# Patient Record
Sex: MALE | Race: WHITE | NOT HISPANIC OR LATINO | Employment: STUDENT | URBAN - METROPOLITAN AREA
[De-identification: names, ages, dates, MRNs, and addresses within clinical notes are randomized per-mention and may not be internally consistent; named-entity substitution may affect disease eponyms.]

---
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
## 2021-01-22 DIAGNOSIS — F41.1 GENERALIZED ANXIETY DISORDER: ICD-10-CM

## 2021-01-25 NOTE — TELEPHONE ENCOUNTER
Last seen: 8/20/20  RTC: 3 months- pt may be transferring care, as he's moving to [place]  Cancel: none  No-show: none  Next appt: none    Called pt and confirmed he no longer needs the [facility] psychiatry clinic to prescribe his Prozac. Pt said he has established care in [place]. Refused refill request with note stating this.

## 2021-02-09 DIAGNOSIS — F41.1 GENERALIZED ANXIETY DISORDER: ICD-10-CM

## 2021-02-09 RX ORDER — FLUOXETINE 40 MG/1
40 CAPSULE ORAL DAILY
Qty: 30 CAPSULE | Refills: 0 | OUTPATIENT
Start: 2021-02-09

## 2021-02-10 NOTE — TELEPHONE ENCOUNTER
"Per notes PSY staff 1/22/2021 \"Called pt and confirmed he no longer needs the [facility] psychiatry clinic to prescribe his Prozac. Pt said he has established care in [place]. Refused refill request with note stating this. \"  Denied refill request from [facility].       "

## 2023-05-09 NOTE — ADDENDUM NOTE
Encounter addended by: Jackie Gagnon on: 12/30/2019 2:16 PM   Actions taken: Episode edited
Quality 431: Preventive Care And Screening: Unhealthy Alcohol Use - Screening: Patient not identified as an unhealthy alcohol user when screened for unhealthy alcohol use using a systematic screening method
Detail Level: Detailed
Quality 110: Preventive Care And Screening: Influenza Immunization: Influenza Immunization not Administered because Patient Refused.
Quality 226: Preventive Care And Screening: Tobacco Use: Screening And Cessation Intervention: Patient screened for tobacco use and is an ex/non-smoker
Quality 130: Documentation Of Current Medications In The Medical Record: Current Medications Documented

## 2023-09-28 NOTE — PROGRESS NOTES
ASSESSMENT AND PLAN:   (F41.1) ASHLEY (generalized anxiety disorder)  (primary encounter diagnosis)  Comment: Patient reports continuing anxiety from school work. Endorses that Buspirone has done a good job controlling the symptoms.  Plan: busPIRone (BUSPAR) 10 MG tablet, BID    (L70.0) Acne vulgaris  Comment: Patient has been using the cream to control acne vulgaris, and it has done a good job controlling the symptoms.  Plan: tretinoin (RETIN-A) 0.05 % external cream    (F40.248) Fear of public speaking  Comment: Patient reports high anxiety during public speaking during school work, and the medication does a good job controlling the symptoms.  Plan: propranolol (INDERAL) 10 MG tablet    (F90.0) Attention deficit hyperactivity disorder (ADHD), predominantly inattentive type  Comment: has not needed it for a couple of years; however, needs to concentrate to do a better job with current school work.  Plan: amphetamine-dextroamphetamine (ADDERALL) 10 MG         tablet    (Z23) Need for prophylactic vaccination and inoculation against influenza  Comment: Patient agrees to be vaccinated for the flu season.  Plan: INFLUENZA VACCINE >6 MONTHS (AFLURIA/FLUZONE)      ANUP Youngblood  09/29/23      I was present with the medical student who participated in the service and in the documentation of the note. I have verified the history and personally performed the physical exam and medical decision making. I agree with the assessment and plan of care as documented in the note with the following additions:    (F41.1) ASHLEY (generalized anxiety disorder)  (primary encounter diagnosis)  (F42.9) Obsessive-compulsive disorder, unspecified type  Comment: Chronic, stable. Continue buspirone.   Plan: busPIRone (BUSPAR) 10 MG tablet    (L70.0) Acne vulgaris  Comment: Chronic, stable.   Plan: tretinoin (RETIN-A) 0.05 % external cream          (F40.248) Fear of public speaking  Comment: Chronic, stable.   Plan: propranolol (INDERAL) 10 MG  tablet          (F90.0) Attention deficit hyperactivity disorder (ADHD), predominantly inattentive type  Comment: Chronic, stable. Reviewed records of diagnosis and previous management available in EHR. Continue low dose Adderall IR for now. Can message me if he would like to restart Vyvanse 20mg -> 30mg as school year progresses.   Plan: amphetamine-dextroamphetamine (ADDERALL) 10 MG         tablet          (Z23) Need for prophylactic vaccination and inoculation against influenza  Comment: Plan: INFLUENZA VACCINE >6 MONTHS (AFLURIA/FLUZONE)          (L20.9) Atopic dermatitis and related condition  Comment: Chronic, stable.   Plan: triamcinolone (KENALOG) 0.1 % external cream          Paul Penn MD  8:17 AM, September 30, 2023      SUBJECTIVE:   Miguelito is a 23 year old male who presents to clinic today to establish care and to discuss the following problem(s).    # OCD  - diagnosed at 8 yo  - hasn't had bothersome obsessive/compulsive symptoms for a long period  - stopped fluoxetine last summer 2022    # ASHLEY  - diagnosed at 12 yo, due to academic and social anxiety  - anxiety has been up more with school start  - feels that Buspirone 10mg twice a day does a good job controlling symptoms  - no therapist at the moment    - uses Propranolol 10mg PRN for public speaking anxiety, which works wells for him    - around [age], had post-infectious fatigue after mono, depressed mood, IOP [facility] visit for 1 week, [facility] program at [place] for 2 month  - fatigue symptoms resolved last summer 2022  - stopped fluoxetine last summer 2022    # Chronic cough  - started with URI sx, lasted 6 weeks, caused some anxiety  - doxycycline 8/14 for a week  - levofloxacin 9/15 for five day course  - using flonase and nasal rinses  - reports mild wheezing right lung a couple of days ago, but went away  - currently not breathing issues  - still has some sinus/nasal symptoms    -  diagnosed with asthma as a child, had abnormal  spirometry  - then symptoms resolved and had repeat normal spirometry  - no family history of asthma    # Moderate Eczema   - since childhood  - not too dry, mostly itchy on skin folding area (arm, armpit, legs)   - doesn't regularly use lotion  - uses triamcinolone 0.5% or 0.1% cream PRN for flares -usually just a single dose at a time    # Compression fractured L1   - May of 2020, from rolling down a snow bank and hitting a curb  - denies any pain associated with the injury at this point    # Acne  - uses tretinoin 0.05% cream for 5-6 years  - uses BP maybe once a week  - uses sun screen regularly          2019     8:00 AM   PHQ   PHQ-9 Total Score 9   Q9: Thoughts of better off dead/self-harm past 2 weeks Not at all          No data to display                  # ADHD  - diagnosed in 7th grade by neuropsych at [facility] by report  - mother has hx of ADHD and  recommended to be seen by specialist  - not currently taking anything but plans to resume with school    Previous Regimen  Adderall, not taken for 2 years, yet want to keep it on file in case needed for studying  Vyvanse, not taking now, yet has about 15 caps left with mix of 10mg and 20mg      Past Medical History:   Diagnosis Date    ADHD (attention deficit hyperactivity disorder)     Chronic fatigue syndrome 2018    from Mononucleosis, missed 3 years of school    Compression fracture of L1 lumbar vertebra (H)     Eczema     ASHLEY (generalized anxiety disorder)     OCD (obsessive compulsive disorder)      History reviewed. No pertinent surgical history.  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, school is going well    Live at apartment w/ x2 work friend at [employer] at [employer] who also go to the same school    Plays piano enjoys Weight-lifting    Enjoy writing considering law school or medicine       Current Outpatient Medications   Medication    amphetamine-dextroamphetamine (ADDERALL) 10 MG tablet    busPIRone (BUSPAR) 10 MG tablet    propranolol (INDERAL) 10 MG tablet    tretinoin (RETIN-A) 0.05 % external cream    triamcinolone (KENALOG) 0.1 % external cream    VYVANSE 20 MG capsule     No current facility-administered medications for this visit.     I have reviewed the patient's past medical, surgical, family, and social history.     OBJECTIVE:   /69   Pulse 80   Temp 98.1  F (36.7  C)   Ht 1.765 m (5' 9.49\")   Wt 66.7 kg (147 lb)   SpO2 97%   BMI 21.40 kg/m      Constitutional: well-appearing, appears stated age  Eyes: conjunctivae without erythema, sclera anicteric.   Skin: no rashes, lesions, or wounds  Psych: affect is full and appropriate, speech is fluent and non-pressured  "

## 2023-09-29 ENCOUNTER — OFFICE VISIT (OUTPATIENT)
Dept: FAMILY MEDICINE | Facility: CLINIC | Age: 23
End: 2023-09-29
Payer: COMMERCIAL

## 2023-09-29 VITALS
TEMPERATURE: 98.1 F | OXYGEN SATURATION: 97 % | DIASTOLIC BLOOD PRESSURE: 69 MMHG | HEIGHT: 69 IN | SYSTOLIC BLOOD PRESSURE: 117 MMHG | HEART RATE: 80 BPM | WEIGHT: 147 LBS | BODY MASS INDEX: 21.77 KG/M2

## 2023-09-29 DIAGNOSIS — F90.0 ATTENTION DEFICIT HYPERACTIVITY DISORDER (ADHD), PREDOMINANTLY INATTENTIVE TYPE: ICD-10-CM

## 2023-09-29 DIAGNOSIS — L20.9 ATOPIC DERMATITIS AND RELATED CONDITION: Chronic | ICD-10-CM

## 2023-09-29 DIAGNOSIS — F41.1 GAD (GENERALIZED ANXIETY DISORDER): Primary | ICD-10-CM

## 2023-09-29 DIAGNOSIS — F42.9 OBSESSIVE-COMPULSIVE DISORDER, UNSPECIFIED TYPE: Chronic | ICD-10-CM

## 2023-09-29 DIAGNOSIS — F40.248 FEAR OF PUBLIC SPEAKING: ICD-10-CM

## 2023-09-29 DIAGNOSIS — L70.0 ACNE VULGARIS: ICD-10-CM

## 2023-09-29 DIAGNOSIS — Z23 NEED FOR PROPHYLACTIC VACCINATION AND INOCULATION AGAINST INFLUENZA: ICD-10-CM

## 2023-09-29 RX ORDER — PROPRANOLOL HYDROCHLORIDE 10 MG/1
10 TABLET ORAL PRN
COMMUNITY
Start: 2023-03-26 | End: 2023-09-29

## 2023-09-29 RX ORDER — TRIAMCINOLONE ACETONIDE 1 MG/G
CREAM TOPICAL 2 TIMES DAILY
COMMUNITY

## 2023-09-29 RX ORDER — BUSPIRONE HYDROCHLORIDE 10 MG/1
10 TABLET ORAL 2 TIMES DAILY
COMMUNITY
Start: 2023-03-29 | End: 2023-09-29

## 2023-09-29 RX ORDER — PROPRANOLOL HYDROCHLORIDE 10 MG/1
10 TABLET ORAL PRN
Qty: 90 TABLET | Refills: 3 | Status: SHIPPED | OUTPATIENT
Start: 2023-09-29

## 2023-09-29 RX ORDER — TRETINOIN 0.5 MG/G
CREAM TOPICAL AT BEDTIME
Qty: 45 G | Refills: 3 | Status: SHIPPED | OUTPATIENT
Start: 2023-09-29

## 2023-09-29 RX ORDER — BUSPIRONE HYDROCHLORIDE 10 MG/1
10 TABLET ORAL 2 TIMES DAILY
Qty: 180 TABLET | Refills: 3 | Status: SHIPPED | OUTPATIENT
Start: 2023-09-29

## 2023-09-29 RX ORDER — DEXTROAMPHETAMINE SACCHARATE, AMPHETAMINE ASPARTATE, DEXTROAMPHETAMINE SULFATE AND AMPHETAMINE SULFATE 2.5; 2.5; 2.5; 2.5 MG/1; MG/1; MG/1; MG/1
10 TABLET ORAL DAILY
Qty: 30 TABLET | Refills: 0 | Status: SHIPPED | OUTPATIENT
Start: 2023-09-29 | End: 2024-02-05

## 2023-09-29 RX ORDER — TRETINOIN 0.5 MG/G
CREAM TOPICAL AT BEDTIME
COMMUNITY
Start: 2023-07-05 | End: 2023-09-29

## 2023-09-29 NOTE — NURSING NOTE
"23 year old  Chief Complaint   Patient presents with    Establish Care     Take over pts medication         Blood pressure 117/69, pulse 80, temperature 98.1  F (36.7  C), height 1.765 m (5' 9.49\"), weight 66.7 kg (147 lb), SpO2 97 %. Body mass index is 21.4 kg/m .  Patient Active Problem List   Diagnosis    Circadian rhythm sleep disorder, delayed sleep phase type    ASHLEY (generalized anxiety disorder)    OCD (obsessive compulsive disorder)       Wt Readings from Last 2 Encounters:   09/29/23 66.7 kg (147 lb)   01/31/20 56.7 kg (125 lb) (11 %, Z= -1.24)*     * Growth percentiles are based on [facility] (Boys, 2-20 Years) data.     BP Readings from Last 3 Encounters:   09/29/23 117/69   01/31/20 122/76   12/05/19 113/64         Current Outpatient Medications   Medication    busPIRone (BUSPAR) 10 MG tablet    propranolol (INDERAL) 10 MG tablet    tretinoin (RETIN-A) 0.05 % external cream    VYVANSE 20 MG capsule    amphetamine-dextroamphetamine (ADDERALL) 10 MG tablet    FLUoxetine (PROZAC) 20 MG capsule    FLUoxetine (PROZAC) 40 MG capsule    ibuprofen (ADVIL/MOTRIN) 400 MG tablet     No current facility-administered medications for this visit.       Social History     Tobacco Use    Smoking status: Never    Smokeless tobacco: Never   Substance Use Topics    Alcohol use: Yes     Comment: very little    Drug use: Never       Health Maintenance Due   Topic Date Due    YEARLY PREVENTIVE VISIT  Never done    ADVANCE CARE PLANNING  Never done    HIV SCREENING  Never done    HEPATITIS C SCREENING  Never done    INFLUENZA VACCINE (1) 09/01/2023    COVID-19 Vaccine (4 - 2023-24 season) 09/01/2023    DTAP/TDAP/TD IMMUNIZATION (7 - Td or Tdap) 10/09/2023       No results found for: PAP      September 29, 2023 3:25 PM    "

## 2023-09-30 PROBLEM — L70.0 ACNE VULGARIS: Status: ACTIVE | Noted: 2023-09-30

## 2023-09-30 PROBLEM — L70.0 ACNE VULGARIS: Chronic | Status: ACTIVE | Noted: 2023-09-30

## 2023-09-30 PROBLEM — F40.248 FEAR OF PUBLIC SPEAKING: Status: ACTIVE | Noted: 2023-09-30

## 2023-09-30 PROBLEM — G47.21 CIRCADIAN RHYTHM SLEEP DISORDER, DELAYED SLEEP PHASE TYPE: Chronic | Status: ACTIVE | Noted: 2019-10-31

## 2023-09-30 PROBLEM — F90.0 ATTENTION DEFICIT HYPERACTIVITY DISORDER (ADHD), PREDOMINANTLY INATTENTIVE TYPE: Chronic | Status: ACTIVE | Noted: 2023-09-30

## 2023-09-30 PROBLEM — F42.9 OCD (OBSESSIVE COMPULSIVE DISORDER): Chronic | Status: RESOLVED | Noted: 2020-05-22 | Resolved: 2023-09-30

## 2023-09-30 PROBLEM — F90.0 ATTENTION DEFICIT HYPERACTIVITY DISORDER (ADHD), PREDOMINANTLY INATTENTIVE TYPE: Status: ACTIVE | Noted: 2023-09-30

## 2023-09-30 PROBLEM — F40.248 FEAR OF PUBLIC SPEAKING: Chronic | Status: ACTIVE | Noted: 2023-09-30

## 2023-09-30 PROBLEM — F41.1 GAD (GENERALIZED ANXIETY DISORDER): Chronic | Status: ACTIVE | Noted: 2019-11-21

## 2023-09-30 PROBLEM — F42.9 OCD (OBSESSIVE COMPULSIVE DISORDER): Chronic | Status: ACTIVE | Noted: 2020-05-22

## 2023-11-26 ENCOUNTER — HEALTH MAINTENANCE LETTER (OUTPATIENT)
Age: 23
End: 2023-11-26

## 2024-02-05 ENCOUNTER — OFFICE VISIT (OUTPATIENT)
Dept: FAMILY MEDICINE | Facility: CLINIC | Age: 24
End: 2024-02-05
Payer: COMMERCIAL

## 2024-02-05 VITALS
WEIGHT: 157.08 LBS | BODY MASS INDEX: 23.27 KG/M2 | DIASTOLIC BLOOD PRESSURE: 73 MMHG | HEIGHT: 69 IN | SYSTOLIC BLOOD PRESSURE: 117 MMHG | OXYGEN SATURATION: 97 % | HEART RATE: 86 BPM | TEMPERATURE: 98.7 F

## 2024-02-05 DIAGNOSIS — R06.2 WHEEZING: ICD-10-CM

## 2024-02-05 DIAGNOSIS — R09.81 CHRONIC NASAL CONGESTION: ICD-10-CM

## 2024-02-05 DIAGNOSIS — R05.3 CHRONIC COUGH: Primary | ICD-10-CM

## 2024-02-05 NOTE — PATIENT INSTRUCTIONS
1) Call to schedule your x-ray  [address] [place] [address]  [phone]    2) Call to schedule your breathing tests  [phone]    3) ENT will call you to schedule  If you need it, their number is [phone]     4) Start using Flonase every day at least until you see ENT

## 2024-02-05 NOTE — PROGRESS NOTES
ASSESSMENT AND PLAN:     (R05.3) Chronic cough  (primary encounter diagnosis)  (R06.2) Wheezing  Comment: Chronic, stable.  Differential includes upper airway cough syndrome but also asthma given the wheezing, mild chest tightness with exertion, atopy history. Check chest x-ray for structural causes. Check spirometry with methacholine challenge if needed for possible asthma. Resume Flonase daily.  Plan: XR Chest 2 Views, General PFT Lab (Please         always keep checked), Pulmonary Function Test          (R09.81) Chronic nasal congestion  Comment: Chronic, stable.  Interested in septoplasty to address deviated septum and chronic nasal obstruction that makes nose breathing during exercise difficulty.  Resume Flonase  Sending to ENT for evaluation.   Plan: Adult ENT  Referral                Paul Penn MD   [facility]  02/05/2024, 5:43 PM      SUBJECTIVE:   Miguelito is a 23 year old male who presents to clinic today for a return visit.    # Cough  - cough started about a year ago  - thinks it started with a cold, cough lasted 1-1.5 months  - the cough improved and may have gone away entirely    - got sick again in summer 2023  - cough returned  - went to urgent care 8/14/23 (2-3 weeks in), gave prescription for Doxycycline x7 days   - he completed this but didn't help  - went to [place] sometime early September 2023, was given a prescription for cipro, which he completed and also didn't help    - cough slowly improved but has continued to be present intermittently  - a couple of times a week, will have a cough for several hours  - can be any time of time  - sometimes productive of clear phlegm  - has continued to improved in terms of frequency    - started having wheezing intermittently in 10/2023  - at the end of an exhalation or inhalation (but more pronounced with end of exhalation) will hear a high pitched sound in his breathing     - exercise is weight lifting 3-4 days a week and  "cardio twice a week (10 minutes on treadmill)  - bikes to class  - none of that activity brings on the cough or wheezing  - started the cardio a couple of months ago  - doesn't feel like he can do as much cardio as he thinks he should but unsure if this is just deconditioning  - if he is running or biking and breathing deeply he will sometimes feel some tightness in chest    - no nighttime awakenings with respiratory symptoms  - over the winter had nasal congestion but better now  - no rhinorrhea  - has constant sensation of postnasal drip  - no heart burn or reflux    - has tried Flonase for a few weeks at a time  - will help with congestion and tends to help with cough    - when he was age 5 he was diagnosed with asthma but this was taken off his medical history later in childhood  - no family members with asthma  - he has does have eczema and food allergies (fish, shellfish)      # Deviated Septum      Patient Active Problem List   Diagnosis    Circadian rhythm sleep disorder, delayed sleep phase type    ASHLEY (generalized anxiety disorder)    OCD (obsessive compulsive disorder)    Atopic dermatitis and related condition    Attention deficit hyperactivity disorder (ADHD), predominantly inattentive type    Fear of public speaking    Acne vulgaris     Current Outpatient Medications   Medication    busPIRone (BUSPAR) 10 MG tablet    propranolol (INDERAL) 10 MG tablet    tretinoin (RETIN-A) 0.05 % external cream    triamcinolone (KENALOG) 0.1 % external cream     No current facility-administered medications for this visit.       I have reviewed the patient's relevant past medical history.     OBJECTIVE:   /73   Pulse 86   Temp 98.7  F (37.1  C)   Ht 1.765 m (5' 9.49\")   Wt 71.3 kg (157 lb 1.3 oz)   SpO2 97%   BMI 22.87 kg/m      Constitutional: well-appearing, appears stated age  Eyes: conjunctivae without erythema, sclera anicteric.   Respiratory: lungs clear to auscultation bilaterally, normal work of " breathing, no wheezes/crackles  Skin: no rashes, lesions, or wounds  Psych: affect is full and appropriate, speech is fluent and non-pressured

## 2024-02-08 ENCOUNTER — ANCILLARY PROCEDURE (OUTPATIENT)
Dept: GENERAL RADIOLOGY | Facility: CLINIC | Age: 24
End: 2024-02-08
Attending: FAMILY MEDICINE
Payer: COMMERCIAL

## 2024-02-08 DIAGNOSIS — R05.3 CHRONIC COUGH: ICD-10-CM

## 2024-02-08 PROCEDURE — 71046 X-RAY EXAM CHEST 2 VIEWS: CPT | Mod: GC | Performed by: RADIOLOGY

## 2024-04-04 NOTE — TELEPHONE ENCOUNTER
"FUTURE VISIT INFORMATION      FUTURE VISIT INFORMATION:  Date: 7/1/24  Time: 1pm  Location: [facility]  REFERRAL INFORMATION:  Referring provider:  Paul Penn MD   Referring providers clinic:  [facility]   Reason for visit/diagnosis  per patient deviated septum chronic nasal congestion confirmed [facility]     RECORDS REQUESTED FROM:       Clinic name Comments Records Status Imaging Status   [facility]  2/5/24- OV Paul Penn MD  [facility]  8/14/23- OV Kavita Dick, JESUS ALBERTO   CE            \"Please notify/message CSS if patient completed outside imaging prior to scheduled appointment and/or any outside records that might have been missed at pre visit -Thank you\"  "

## 2024-05-19 NOTE — TELEPHONE ENCOUNTER
Message   Received: Today   Message Contents   Demian Tavarez MD Patel, Radhika, SANTIAGO Venegas,     Could you e-mail two ROIs to [email or web address] for Miguelito to fill out for a couple of his new providers?     Thank you so much!    Juan Carlos     - 2 blank YONAS emailed to mom's e-mail per providers request with direction to email or fax them back    Placed a call to mom to follow up if the e-mail was received. No answer at number provided. LVM, requesting a call back if the YONAS were not received. Clinic number provided.       <-- Click to add NO pertinent Past Medical History

## 2024-07-01 ENCOUNTER — OFFICE VISIT (OUTPATIENT)
Dept: OTOLARYNGOLOGY | Facility: CLINIC | Age: 24
End: 2024-07-01
Payer: COMMERCIAL

## 2024-07-01 ENCOUNTER — PRE VISIT (OUTPATIENT)
Dept: OTOLARYNGOLOGY | Facility: CLINIC | Age: 24
End: 2024-07-01

## 2024-07-01 VITALS
BODY MASS INDEX: 21.47 KG/M2 | HEIGHT: 70 IN | HEART RATE: 71 BPM | SYSTOLIC BLOOD PRESSURE: 112 MMHG | DIASTOLIC BLOOD PRESSURE: 74 MMHG | WEIGHT: 150 LBS | OXYGEN SATURATION: 97 %

## 2024-07-01 DIAGNOSIS — R09.81 CHRONIC NASAL CONGESTION: ICD-10-CM

## 2024-07-01 PROCEDURE — 99203 OFFICE O/P NEW LOW 30 MIN: CPT | Performed by: OTOLARYNGOLOGY

## 2024-07-01 RX ORDER — PREDNISONE 10 MG/1
TABLET ORAL
COMMUNITY
Start: 2024-03-24

## 2024-07-01 ASSESSMENT — PAIN SCALES - GENERAL: PAINLEVEL: NO PAIN (0)

## 2024-07-01 NOTE — PROGRESS NOTES
[facility] E[facility] AND [facility]IN[facility] [address] [address]  Phone: [phone]  Fax: [phone]    Patient:  Miguelito Omer, Date of birth 2000  Date of Visit:  07/01/2024  Referring Provider Paul Penn      Assessment & Plan      (R09.81) Chronic nasal congestion  Comment: Complex nasal anatomy with external deformity that is impacting configuration of septum.   Plan: CT Sinus w/o Contrast        May need external reconstruction to address nasal deformity and improve breathing. Will order CT to assess nasal anatomy, and assess for sinus issues given PND. Will have patient see one of my facial plastics colleagues to discuss definitive repair.     20 minutes spent by me on the date of the encounter doing chart review, history and exam, documentation and further activities per the note       Brigid Damian MD       Otolaryngology Adult Consultation    HPI: Miguelito Omer is a 23 year old male seen today in the Otolaryngology Clinic in consultation from Paul Penn for a history of trouble breathing through his nose. Has been present for years, R>L. Has allergies, uses flonase. C/O PND. No injury or surgery. Hard to exercise. No issues with sleep. OK with external nasal appearance.     Current Outpatient Medications   Medication Sig Dispense Refill    busPIRone (BUSPAR) 10 MG tablet Take 1 tablet (10 mg) by mouth 2 times daily 180 tablet 3    propranolol (INDERAL) 10 MG tablet Take 1 tablet (10 mg) by mouth as needed (patient takes as needed) 90 tablet 3    tretinoin (RETIN-A) 0.05 % external cream Apply topically At Bedtime 45 g 3    triamcinolone (KENALOG) 0.1 % external cream Apply topically 2 times daily       No current facility-administered medications for this visit.          Allergies   Allergen Reactions    Fish Allergy Unknown    Penicillins Rash     As a young child, not hospitalized       Past Medical History:   Diagnosis  Date    ADHD (attention deficit hyperactivity disorder)     Chronic fatigue syndrome 09/07/2018    from Mononucleosis, missed 3 years of school    Compression fracture of L1 lumbar vertebra (H) 05/2020    Eczema     ASHLEY (generalized anxiety disorder)     OCD (obsessive compulsive disorder)        Social History     Occupational History    Not on file   Tobacco Use    Smoking status: Never     Passive exposure: Never    Smokeless tobacco: Never   Vaping Use    Vaping status: Never Used   Substance and Sexual Activity    Alcohol use: Yes     Comment: very little, < 5 drinks per week    Drug use: Never    Sexual activity: Not Currently     Partners: Male     Comment: Single at this point        Review of Systems       No data to display                 14 point ROS neg other than the symptoms noted above.    Physical Exam:    General Assessment   The patient is alert, oriented and in no acute distress.   Head/Face/Scalp  Grossly normal.   Nose  External nose is off midline to the R, moderate dorsal prominence, skin is normal. Intranasal exam (anterior rhinoscopy) reveals normal moist mucosa, turbinate tissue without edema, erythema or crusting.  Septum partially obstructing anterior superior on R, spur lower left.

## 2024-07-01 NOTE — PATIENT INSTRUCTIONS
You were seen in the ENT Clinic today by Dr. Damian. If you have any questions or concerns after your appointment, please contact us (see below)       2.   The following recommendations have been made based upon your appointment today:   -Obtain CT scan.   -Follow up with either Dr. Soila Valles or Dr. Manju Nunn.           How to Contact Us:  Send a TRELYS message to your provider. Our team will respond to you via TRELYS. Occasionally, we will need to call you to get further information.  For urgent matters (Monday-Friday), call the ENT Clinic: [phone] and speak with a call center team member - they will route your call appropriately.   If you'd like to speak directly with a nurse, please find our contact information below. We do our best to check voicemail frequently throughout the day, and will work to call you back within 1-2 days. For urgent matters, please use the general clinic phone numbers listed above.     Delaney ALEJO RN  Direct: [phone]  Sheree UNGER LPN  Direct: [phone]         [facility]  Department of Otolaryngology

## 2024-07-01 NOTE — NURSING NOTE
"Chief Complaint   Patient presents with    Consult   Height 1.778 m (5' 10\"), weight 68 kg (150 lb). Babatunde Newman, EMT    "

## 2024-07-01 NOTE — LETTER
7/1/2024       RE: Miguelito Omer  [address] [place] [facility] [address]     Dear Colleague,    Thank you for referring your patient, Miguelito Omer, to the [facility] EAR NOSE AND THROAT CLINIC [place] [facility]. Please see a copy of my visit note below.      [facility] EAR NOSE [facility] THROAT CLINIC [place] [address] [facility]  [address]TH FLOOR  [facility] [address]  Phone: [phone]  Fax: [phone]    Patient:  Miguelito Omer, Date of birth 2000  Date of Visit:  07/01/2024  Referring Provider Paul Penn      Assessment & Plan     (R09.81) Chronic nasal congestion  Comment: Complex nasal anatomy with external deformity that is impacting configuration of septum.   Plan: CT Sinus w/o Contrast        May need external reconstruction to address nasal deformity and improve breathing. Will order CT to assess nasal anatomy, and assess for sinus issues given PND. Will have patient see one of my facial plastics colleagues to discuss definitive repair.     20 minutes spent by me on the date of the encounter doing chart review, history and exam, documentation and further activities per the note       Brigid Damian MD       Otolaryngology Adult Consultation    HPI: Miguelito Omer is a 23 year old male seen today in the Otolaryngology Clinic in consultation from Paul Penn for a history of trouble breathing through his nose. Has been present for years, R>L. Has allergies, uses flonase. C/O PND. No injury or surgery. Hard to exercise. No issues with sleep. OK with external nasal appearance.     Current Outpatient Medications   Medication Sig Dispense Refill    busPIRone (BUSPAR) 10 MG tablet Take 1 tablet (10 mg) by mouth 2 times daily 180 tablet 3    propranolol (INDERAL) 10 MG tablet Take 1 tablet (10 mg) by mouth as needed (patient takes as needed) 90 tablet 3    tretinoin (RETIN-A) 0.05 % external cream Apply  topically At Bedtime 45 g 3    triamcinolone (KENALOG) 0.1 % external cream Apply topically 2 times daily       No current facility-administered medications for this visit.          Allergies   Allergen Reactions    Fish Allergy Unknown    Penicillins Rash     As a young child, not hospitalized       Past Medical History:   Diagnosis Date    ADHD (attention deficit hyperactivity disorder)     Chronic fatigue syndrome 09/07/2018    from Mononucleosis, missed 3 years of school    Compression fracture of L1 lumbar vertebra (H) 05/2020    Eczema     ASHLEY (generalized anxiety disorder)     OCD (obsessive compulsive disorder)        Social History     Occupational History    Not on file   Tobacco Use    Smoking status: Never     Passive exposure: Never    Smokeless tobacco: Never   Vaping Use    Vaping status: Never Used   Substance and Sexual Activity    Alcohol use: Yes     Comment: very little, < 5 drinks per week    Drug use: Never    Sexual activity: Not Currently     Partners: Male     Comment: Single at this point        Review of Systems       No data to display                 14 point ROS neg other than the symptoms noted above.    Physical Exam:    General Assessment   The patient is alert, oriented and in no acute distress.   Head/Face/Scalp  Grossly normal.   Nose  External nose is off midline to the R, moderate dorsal prominence, skin is normal. Intranasal exam (anterior rhinoscopy) reveals normal moist mucosa, turbinate tissue without edema, erythema or crusting.  Septum partially obstructing anterior superior on R, spur lower left.         Again, thank you for allowing me to participate in the care of your patient.      Sincerely,    Brigid Damian MD

## 2024-09-19 NOTE — TELEPHONE ENCOUNTER
FUTURE VISIT INFORMATION      FUTURE VISIT INFORMATION:  Date: 10/30/24  Time: 10:45AM  Location[place] [facility]  REFERRAL INFORMATION:  Referring provider:  Paul Penn MD   Referring providers clinic:  [place]   Reason for visit/diagnosis  Chronic nasal congestion     RECORDS REQUESTED FROM:       Clinic name Comments Records Status Imaging Status   [facility] 7/1/24- OV wLaura Damian  [facility]   2/5/24- Ov wPaul Hernández MD  [facility]  8/14/23- OV w. Kavita Dick D PA- mariaelena CE    Imaging  2/8/24- XR Chest  [facility]   PACS

## 2024-10-28 ENCOUNTER — ANCILLARY PROCEDURE (OUTPATIENT)
Dept: CT IMAGING | Facility: CLINIC | Age: 24
End: 2024-10-28
Attending: OTOLARYNGOLOGY
Payer: COMMERCIAL

## 2024-10-28 ENCOUNTER — TELEPHONE (OUTPATIENT)
Dept: OTOLARYNGOLOGY | Facility: CLINIC | Age: 24
End: 2024-10-28
Payer: COMMERCIAL

## 2024-10-28 DIAGNOSIS — R09.81 CHRONIC NASAL CONGESTION: ICD-10-CM

## 2024-10-28 PROCEDURE — 70486 CT MAXILLOFACIAL W/O DYE: CPT | Performed by: STUDENT IN AN ORGANIZED HEALTH CARE EDUCATION/TRAINING PROGRAM

## 2024-10-28 NOTE — TELEPHONE ENCOUNTER
Writer LVM stating that pts CT order has been changed and pt will need to reach out to get himself scheduled. Writer left direct line to call with questions or concerns.    Delaney Ko RN

## 2024-10-30 ENCOUNTER — OFFICE VISIT (OUTPATIENT)
Dept: OTOLARYNGOLOGY | Facility: CLINIC | Age: 24
End: 2024-10-30
Payer: COMMERCIAL

## 2024-10-30 ENCOUNTER — PRE VISIT (OUTPATIENT)
Dept: OTOLARYNGOLOGY | Facility: CLINIC | Age: 24
End: 2024-10-30

## 2024-10-30 VITALS
WEIGHT: 147 LBS | HEART RATE: 97 BPM | BODY MASS INDEX: 21.05 KG/M2 | HEIGHT: 70 IN | SYSTOLIC BLOOD PRESSURE: 125 MMHG | DIASTOLIC BLOOD PRESSURE: 75 MMHG | OXYGEN SATURATION: 98 %

## 2024-10-30 DIAGNOSIS — J34.89 NASAL OBSTRUCTION: Primary | ICD-10-CM

## 2024-10-30 PROCEDURE — 99203 OFFICE O/P NEW LOW 30 MIN: CPT | Mod: 25 | Performed by: OTOLARYNGOLOGY

## 2024-10-30 PROCEDURE — 92511 NASOPHARYNGOSCOPY: CPT | Performed by: OTOLARYNGOLOGY

## 2024-10-30 NOTE — PROGRESS NOTES
Facial Plastic and Reconstructive Surgery Consultation    Referring Provider:  Brigid Damian MD    HPI:   I had the pleasure of seeing Miguelito Omer today in clinic for consultation for significant nasal obstruction.   Miguelito Omer is a 24 year old male with a history of nasal obstruction has been longstanding for him.  He has difficulty breathing through the right side of his nose but feels like the left is also not good.  He also feels like he has postnasal drip all the time and significant nasal congestion.  He has previously been evaluated and was referred to us for right greater than left nasal obstruction and is currently on Flonase.  He does not note with over 3 months use of it any significant improvement.  He is found to have a deviated nasal septum.  He does not know of any significant trauma in his past however his nose is shifted to the right.      Review Of Systems  ROS: 10 point ROS neg other than the symptoms noted above in the HPI.    Patient Active Problem List   Diagnosis    Circadian rhythm sleep disorder, delayed sleep phase type    ASHLEY (generalized anxiety disorder)    OCD (obsessive compulsive disorder)    Atopic dermatitis and related condition    Attention deficit hyperactivity disorder (ADHD), predominantly inattentive type    Fear of public speaking    Acne vulgaris     No past surgical history on file.  Current Outpatient Medications   Medication Sig Dispense Refill    busPIRone (BUSPAR) 10 MG tablet Take 1 tablet (10 mg) by mouth 2 times daily 180 tablet 3    propranolol (INDERAL) 10 MG tablet Take 1 tablet (10 mg) by mouth as needed (patient takes as needed) 90 tablet 3    tretinoin (RETIN-A) 0.05 % external cream Apply topically At Bedtime 45 g 3    predniSONE (DELTASONE) 10 MG tablet TAKE 3 TABLETS BY MOUTH DAILY FOR 2 DAYS THEN TAKE 2 TABLETS BY MOUTH DAILY FOR 2 DAYS THEN TAKE 1 TABLET BY MOUTH DAILY WITH A MEAL FOR 2 DAYS (Patient not taking: Reported on 7/1/2024)       triamcinolone (KENALOG) 0.1 % external cream Apply topically 2 times daily       Shellfish allergy, Fish allergy, and Penicillins  Social History     Socioeconomic History    Marital status: Single     Spouse name: Not on file    Number of children: Not on file    Years of education: Not on file    Highest education level: Not on file   Occupational History    Not on file   Tobacco Use    Smoking status: Never     Passive exposure: Never    Smokeless tobacco: Never   Vaping Use    Vaping status: Never Used   Substance and Sexual Activity    Alcohol use: Yes     Comment: very little, < 5 drinks per week    Drug use: Never    Sexual activity: Not Currently     Partners: Male     Comment: Single at this point   Other Topics Concern    Not on file   Social History Narrative    Transferred from [employer], currently [employer] at [employer], school is going well    Live at apartment w/ x2 work friend at [employer] at [facility] who also go to the same school    Weight-lifting for 6 month this year (2023), stopped for pay more attention to school work    Enjoys writing considering law school or medicine    Plays piano     Single     Social Drivers of Health     Financial Resource Strain: Low Risk  (2/5/2024)    Financial Resource Strain     Within the past 12 months, have you or your family members you live with been unable to get utilities (heat, electricity) when it was really needed?: No   Food Insecurity: Low Risk  (2/5/2024)    Food Insecurity     Within the past 12 months, did you worry that your food would run out before you got money to buy more?: No     Within the past 12 months, did the food you bought just not last and you didn t have money to get more?: No   Transportation Needs: Low Risk  (2/5/2024)    Transportation Needs     Within the past 12 months, has lack of transportation kept you from medical appointments, getting your medicines, non-medical meetings or appointments, work, or from  getting things that you need?: No   Physical Activity: Sufficiently Active (2021)    Received from  and [facility],  and [facility]    Exercise Vital Sign     Days of Exercise per Week: 3 days     Minutes of Exercise per Session: 60 min   Stress: Not on file   Social Connections: Not on file   Interpersonal Safety: Not on file   Housing Stability: Low Risk  (2024)    Housing Stability     Do you have housing? : Yes     Are you worried about losing your housing?: No     Family History   Problem Relation Age of Onset    Ovarian Cancer Mother     Cerebrovascular Disease Paternal Grandfather          from complication of stroke    Diabetes No family hx of     Cardiovascular No family hx of        PE:  Alert and Oriented, Answering Questions Appropriately  Atraumatic, Normocephalic, Face Symmetric  Skin: Tristan 2  Facial Nerve Intact and facial movement symmetric  EOM's, PEERL  Nasal Exam: The nasal framework is shifted to the right including the nasal bones and the mid vault.  The nasal bones are asymmetric with shifting to the right.  He also has significant dorsal convexity that is irregular with 2 areas of prominence that are offset from each other.  The right mid vault is collapsed with the left stenosed.  Anterior rhinoscopy demonstrates that his caudal septum is off of midline and sitting in his right nasal airway.  This can be visualized with the base view also significantly we anterior rhinoscopy.  The evaluation demonstrates that his has dynamic nasal valve collapse on the left external and additional stenosis on the right.  Modified Livingston maneuver leads to significant improvement on the right side.    Left inferior turbinate is compensatorily enlarged.    Chin: Normal   Neck: No lymphadenopathy, no thyromegaly, trachea midline  Chest: No wheezing, cyanosis, or stridor  Card: Normal upper extremity pulses and capillary refill, not diaphoretic  Neuro/Psych: CN's  2-12 intact, Moves all extremities, ambulation in intact, positive affect, no notable muscle weakness          PROCEDURE: Nasopharyngoscopy  Indication: Nasal obstruction  Performed by: Stefania Nunn     Consent was obtained. 0 degree sinus endoscope was used. Nasal Endoscopy is performed to provide a more thorough evaluation of the nasal airway than seen on standard nasal speculum exam.  Findings are as follows:   This was performed to evaluate the nasal airway elevated nasopharynx.  It demonstrated that the septum is shifted off the maxillary crest.  The maxillary crest appears to be fractured to the left this can be visualized as we go down with the scope.  There is a central fold in the fracture of the septum demonstrating this moves away from the left towards the right.  The caudal septum is off of the anterior nasal spine.  No mucopurulence or polyps were seen.  The patient tolerated the procedure well.                IMPRESSION:    Acquired nasal deformity likely from trauma due to the angulation of the septal fracture and the external shifting of the nasal vault to the right  Septal deviation  Compensatory inferior turbinate hypertrophy  Left external dynamic nasal valve collapse  Right nasal valve internal stenosis      PLAN:    Miguelito Omer presents today with significant nasal structural deformity which appears to be consistent with force coming from the left towards the right.  The nasal framework is fully shifted over including the upper one third bony vault the mid vault and the tip.  The caudal septum is off of the nasal spine and into the right nasal airway.  The maxillary crest is also fractured as visualized on examination with the scope.  He would benefit from a structural surgery in order to improve his nasal breathing.  We would have to shift his nose to the midline.  This would require medial lateral osteotomies.  We discussed that although this would not be intentional for changing his  profile it would change the prominence as the bones are moved.  He was clear that he would not like a straight profile to have that characteristic of the prominence changed from his nose.  He states he has several family members that have this since his desires to maintain that prominence.    He would need an open approach septorhinoplasty in addition to bilateral  grafts and caudal septal repositioning back onto the anterior nasal spine which would also need to be mobilized.  He would need a caudal septal extension graft.  We discussed surgery today.  We discussed the timeframe for surgery.  We also discussed that I am happy to speak with his mother about the future intervention as she would desire.      Photodocumentation was obtained.     I spent a total of 35 minutes in the care of patient Miguelito Omer during today's office visit. This time includes reviewing the patient's chart and prior history, obtaining a history, performing an examination and evaluation and counseling the patient. This time also includes ordering mediations or tests necessary in addition to communication to other member's of the patient's health care team. Time spent in documentation and care coordination is included.

## 2024-10-30 NOTE — LETTER
10/30/2024       RE: Miguelito Omer  [address] [id number]     Dear Colleague,    Thank you for referring your patient, Miguelito Omer, to the [facility] [place] at [facility]. Please see a copy of my visit note below.    Facial Plastic and Reconstructive Surgery Consultation    Referring Provider:  Brigid Damian MD    HPI:   I had the pleasure of seeing Miguelito Omer today in clinic for consultation for significant nasal obstruction.   Miguelito Omer is a 24 year old male with a history of nasal obstruction has been longstanding for him.  He has difficulty breathing through the right side of his nose but feels like the left is also not good.  He also feels like he has postnasal drip all the time and significant nasal congestion.  He has previously been evaluated and was referred to us for right greater than left nasal obstruction and is currently on Flonase.  He does not note with over 3 months use of it any significant improvement.  He is found to have a deviated nasal septum.  He does not know of any significant trauma in his past however his nose is shifted to the right.      Review Of Systems  ROS: 10 point ROS neg other than the symptoms noted above in the HPI.    Patient Active Problem List   Diagnosis     Circadian rhythm sleep disorder, delayed sleep phase type     ASHLEY (generalized anxiety disorder)     OCD (obsessive compulsive disorder)     Atopic dermatitis and related condition     Attention deficit hyperactivity disorder (ADHD), predominantly inattentive type     Fear of public speaking     Acne vulgaris     No past surgical history on file.  Current Outpatient Medications   Medication Sig Dispense Refill     busPIRone (BUSPAR) 10 MG tablet Take 1 tablet (10 mg) by mouth 2 times daily 180 tablet 3     propranolol (INDERAL) 10 MG tablet Take 1 tablet (10 mg) by mouth as needed (patient  takes as needed) 90 tablet 3     tretinoin (RETIN-A) 0.05 % external cream Apply topically At Bedtime 45 g 3     predniSONE (DELTASONE) 10 MG tablet TAKE 3 TABLETS BY MOUTH DAILY FOR 2 DAYS THEN TAKE 2 TABLETS BY MOUTH DAILY FOR 2 DAYS THEN TAKE 1 TABLET BY MOUTH DAILY WITH A MEAL FOR 2 DAYS (Patient not taking: Reported on 7/1/2024)       triamcinolone (KENALOG) 0.1 % external cream Apply topically 2 times daily       Shellfish allergy, Fish allergy, and Penicillins  Social History     Socioeconomic History     Marital status: Single     Spouse name: Not on file     Number of children: Not on file     Years of education: Not on file     Highest education level: Not on file   Occupational History     Not on file   Tobacco Use     Smoking status: Never     Passive exposure: Never     Smokeless tobacco: Never   Vaping Use     Vaping status: Never Used   Substance and Sexual Activity     Alcohol use: Yes     Comment: very little, < 5 drinks per week     Drug use: Never     Sexual activity: Not Currently     Partners: Male     Comment: Single at this point   Other Topics Concern     Not on file   Social History Narrative    Transferred from [employer], currently [employer] at [employer], school is going well    Live at apartment w/ x2 work friend at [employer] at [facility] who also go to the same school    Weight-lifting for 6 month this year (2023), stopped for pay more attention to school work    Enjoys writing considering law school or medicine    Plays piano     Single     Social Drivers of Health     Financial Resource Strain: Low Risk  (2/5/2024)    Financial Resource Strain      Within the past 12 months, have you or your family members you live with been unable to get utilities (heat, electricity) when it was really needed?: No   Food Insecurity: Low Risk  (2/5/2024)    Food Insecurity      Within the past 12 months, did you worry that your food would run out before you got money to buy more?: No       Within the past 12 months, did the food you bought just not last and you didn t have money to get more?: No   Transportation Needs: Low Risk  (2024)    Transportation Needs      Within the past 12 months, has lack of transportation kept you from medical appointments, getting your medicines, non-medical meetings or appointments, work, or from getting things that you need?: No   Physical Activity: Sufficiently Active (2021)    Received from [facility] and [facility], [facility]    Exercise Vital Sign      Days of Exercise per Week: 3 days      Minutes of Exercise per Session: 60 min   Stress: Not on file   Social Connections: Not on file   Interpersonal Safety: Not on file   Housing Stability: Low Risk  (2024)    Housing Stability      Do you have housing? : Yes      Are you worried about losing your housing?: No     Family History   Problem Relation Age of Onset     Ovarian Cancer Mother      Cerebrovascular Disease Paternal Grandfather          from complication of stroke     Diabetes No family hx of      Cardiovascular No family hx of        PE:  Alert and Oriented, Answering Questions Appropriately  Atraumatic, Normocephalic, Face Symmetric  Skin: Tristan 2  Facial Nerve Intact and facial movement symmetric  EOM's, PEERL  Nasal Exam: The nasal framework is shifted to the right including the nasal bones and the mid vault.  The nasal bones are asymmetric with shifting to the right.  He also has significant dorsal convexity that is irregular with 2 areas of prominence that are offset from each other.  The right mid vault is collapsed with the left stenosed.  Anterior rhinoscopy demonstrates that his caudal septum is off of midline and sitting in his right nasal airway.  This can be visualized with the base view also significantly we anterior rhinoscopy.  The evaluation demonstrates that his has dynamic nasal valve collapse on the left external and additional stenosis  on the right.  Modified Corson maneuver leads to significant improvement on the right side.    Left inferior turbinate is compensatorily enlarged.    Chin: Normal   Neck: No lymphadenopathy, no thyromegaly, trachea midline  Chest: No wheezing, cyanosis, or stridor  Card: Normal upper extremity pulses and capillary refill, not diaphoretic  Neuro/Psych: CN's 2-12 intact, Moves all extremities, ambulation in intact, positive affect, no notable muscle weakness          PROCEDURE: Nasopharyngoscopy  Indication: Nasal obstruction  Performed by: Stefania Nunn     Consent was obtained. 0 degree sinus endoscope was used. Nasal Endoscopy is performed to provide a more thorough evaluation of the nasal airway than seen on standard nasal speculum exam.  Findings are as follows:   This was performed to evaluate the nasal airway elevated nasopharynx.  It demonstrated that the septum is shifted off the maxillary crest.  The maxillary crest appears to be fractured to the left this can be visualized as we go down with the scope.  There is a central fold in the fracture of the septum demonstrating this moves away from the left towards the right.  The caudal septum is off of the anterior nasal spine.  No mucopurulence or polyps were seen.  The patient tolerated the procedure well.                IMPRESSION:    Acquired nasal deformity likely from trauma due to the angulation of the septal fracture and the external shifting of the nasal vault to the right  Septal deviation  Compensatory inferior turbinate hypertrophy  Left external dynamic nasal valve collapse  Right nasal valve internal stenosis      PLAN:    Miguelito Omer presents today with significant nasal structural deformity which appears to be consistent with force coming from the left towards the right.  The nasal framework is fully shifted over including the upper one third bony vault the mid vault and the tip.  The caudal septum is off of the nasal spine and into the  right nasal airway.  The maxillary crest is also fractured as visualized on examination with the scope.  He would benefit from a structural surgery in order to improve his nasal breathing.  We would have to shift his nose to the midline.  This would require medial lateral osteotomies.  We discussed that although this would not be intentional for changing his profile it would change the prominence as the bones are moved.  He was clear that he would not like a straight profile to have that characteristic of the prominence changed from his nose.  He states he has several family members that have this since his desires to maintain that prominence.    He would need an open approach septorhinoplasty in addition to bilateral  grafts and caudal septal repositioning back onto the anterior nasal spine which would also need to be mobilized.  He would need a caudal septal extension graft.  We discussed surgery today.  We discussed the timeframe for surgery.  We also discussed that I am happy to speak with his mother about the future intervention as she would desire.      Photodocumentation was obtained.     I spent a total of 35 minutes in the care of patient Miguelito Omer during today's office visit. This time includes reviewing the patient's chart and prior history, obtaining a history, performing an examination and evaluation and counseling the patient. This time also includes ordering mediations or tests necessary in addition to communication to other member's of the patient's health care team. Time spent in documentation and care coordination is included.           Again, thank you for allowing me to participate in the care of your patient.      Sincerely,    Stefania Nunn MD

## 2024-11-06 ENCOUNTER — PREP FOR PROCEDURE (OUTPATIENT)
Dept: OTOLARYNGOLOGY | Facility: CLINIC | Age: 24
End: 2024-11-06
Payer: COMMERCIAL

## 2024-11-06 DIAGNOSIS — J34.2 NASAL SEPTAL DEVIATION: ICD-10-CM

## 2024-11-06 DIAGNOSIS — M95.0 ACQUIRED NASAL DEFORMITY: ICD-10-CM

## 2024-11-06 DIAGNOSIS — Z87.81 HX OF FRACTURE OF NOSE: Primary | ICD-10-CM

## 2024-11-06 DIAGNOSIS — J34.8212 EXTERNAL NASAL VALVE COLLAPSE, DYNAMIC: ICD-10-CM

## 2024-11-06 DIAGNOSIS — J34.3 HYPERTROPHY OF POSTERIOR END OF INFERIOR TURBINATE: ICD-10-CM

## 2024-11-06 RX ORDER — CLINDAMYCIN PHOSPHATE 900 MG/50ML
900 INJECTION, SOLUTION INTRAVENOUS SEE ADMIN INSTRUCTIONS
OUTPATIENT
Start: 2024-11-06

## 2024-11-06 RX ORDER — CLINDAMYCIN PHOSPHATE 900 MG/50ML
900 INJECTION, SOLUTION INTRAVENOUS
OUTPATIENT
Start: 2024-11-06

## 2024-11-11 ENCOUNTER — TELEPHONE (OUTPATIENT)
Dept: OTOLARYNGOLOGY | Facility: CLINIC | Age: 24
End: 2024-11-11
Payer: COMMERCIAL

## 2024-11-11 NOTE — TELEPHONE ENCOUNTER
Called patient to schedule surgery with Dr. Nunn. Patient said he is looking to schedule either on his Winter break or into Summer of 2025. Let patient know as of right now at [facility] specifically, provider is scheduling into end of February. Otherwise offered to schedule patient at [facility] beginning of January. Patient confirmed he would prefer to be scheduled at [facility] into next summer. Patient was unsure what month specifically would work best and preferred to call back. Patient provided with callback number [phone].     Audrey Franz on 11/11/2024 at 1:14 PM

## 2024-11-11 NOTE — NURSING NOTE
Photodocumentation obtained.    Orders placed for nasal surgery with Dr. Nunn.    Tracey Hopson RN  11/11/2024 1:44 PM

## 2025-01-08 ENCOUNTER — OFFICE VISIT (OUTPATIENT)
Dept: FAMILY MEDICINE | Facility: CLINIC | Age: 25
End: 2025-01-08
Payer: COMMERCIAL

## 2025-01-08 VITALS
DIASTOLIC BLOOD PRESSURE: 74 MMHG | SYSTOLIC BLOOD PRESSURE: 127 MMHG | WEIGHT: 145 LBS | HEART RATE: 75 BPM | TEMPERATURE: 97.7 F | BODY MASS INDEX: 20.76 KG/M2 | HEIGHT: 70 IN | OXYGEN SATURATION: 99 %

## 2025-01-08 DIAGNOSIS — F33.0 MILD RECURRENT MAJOR DEPRESSION: ICD-10-CM

## 2025-01-08 DIAGNOSIS — L64.9 MALE PATTERN ALOPECIA: ICD-10-CM

## 2025-01-08 DIAGNOSIS — F41.1 GAD (GENERALIZED ANXIETY DISORDER): Chronic | ICD-10-CM

## 2025-01-08 DIAGNOSIS — F90.0 ATTENTION DEFICIT HYPERACTIVITY DISORDER (ADHD), PREDOMINANTLY INATTENTIVE TYPE: Chronic | ICD-10-CM

## 2025-01-08 DIAGNOSIS — F40.248 FEAR OF PUBLIC SPEAKING: ICD-10-CM

## 2025-01-08 DIAGNOSIS — F42.9 OBSESSIVE-COMPULSIVE DISORDER, UNSPECIFIED TYPE: Chronic | ICD-10-CM

## 2025-01-08 DIAGNOSIS — Z00.00 ROUTINE GENERAL MEDICAL EXAMINATION AT A HEALTH CARE FACILITY: Primary | ICD-10-CM

## 2025-01-08 RX ORDER — BUPROPION HYDROCHLORIDE 150 MG/1
150 TABLET ORAL EVERY MORNING
Qty: 90 TABLET | Refills: 3 | Status: SHIPPED | OUTPATIENT
Start: 2025-01-08

## 2025-01-08 RX ORDER — PROPRANOLOL HYDROCHLORIDE 10 MG/1
10 TABLET ORAL PRN
Qty: 90 TABLET | Refills: 0 | Status: SHIPPED | OUTPATIENT
Start: 2025-01-08

## 2025-01-08 RX ORDER — BUSPIRONE HYDROCHLORIDE 10 MG/1
10 TABLET ORAL 2 TIMES DAILY
Qty: 180 TABLET | Refills: 3 | Status: SHIPPED | OUTPATIENT
Start: 2025-01-08

## 2025-01-08 RX ORDER — FINASTERIDE 1 MG/1
1 TABLET, FILM COATED ORAL DAILY
COMMUNITY
Start: 2023-10-01 | End: 2025-01-08

## 2025-01-08 RX ORDER — FINASTERIDE 1 MG/1
1 TABLET, FILM COATED ORAL DAILY
Qty: 90 TABLET | Refills: 3 | Status: SHIPPED | OUTPATIENT
Start: 2025-01-08

## 2025-01-08 SDOH — HEALTH STABILITY: PHYSICAL HEALTH: ON AVERAGE, HOW MANY DAYS PER WEEK DO YOU ENGAGE IN MODERATE TO STRENUOUS EXERCISE (LIKE A BRISK WALK)?: 5 DAYS

## 2025-01-08 SDOH — HEALTH STABILITY: PHYSICAL HEALTH: ON AVERAGE, HOW MANY MINUTES DO YOU ENGAGE IN EXERCISE AT THIS LEVEL?: 40 MIN

## 2025-01-08 ASSESSMENT — ANXIETY QUESTIONNAIRES
GAD7 TOTAL SCORE: 2
1. FEELING NERVOUS, ANXIOUS, OR ON EDGE: NOT AT ALL
8. IF YOU CHECKED OFF ANY PROBLEMS, HOW DIFFICULT HAVE THESE MADE IT FOR YOU TO DO YOUR WORK, TAKE CARE OF THINGS AT HOME, OR GET ALONG WITH OTHER PEOPLE?: SOMEWHAT DIFFICULT
3. WORRYING TOO MUCH ABOUT DIFFERENT THINGS: SEVERAL DAYS
6. BECOMING EASILY ANNOYED OR IRRITABLE: NOT AT ALL
GAD7 TOTAL SCORE: 2
5. BEING SO RESTLESS THAT IT IS HARD TO SIT STILL: NOT AT ALL
7. FEELING AFRAID AS IF SOMETHING AWFUL MIGHT HAPPEN: NOT AT ALL
4. TROUBLE RELAXING: NOT AT ALL
IF YOU CHECKED OFF ANY PROBLEMS ON THIS QUESTIONNAIRE, HOW DIFFICULT HAVE THESE PROBLEMS MADE IT FOR YOU TO DO YOUR WORK, TAKE CARE OF THINGS AT HOME, OR GET ALONG WITH OTHER PEOPLE: SOMEWHAT DIFFICULT
GAD7 TOTAL SCORE: 2
7. FEELING AFRAID AS IF SOMETHING AWFUL MIGHT HAPPEN: NOT AT ALL
2. NOT BEING ABLE TO STOP OR CONTROL WORRYING: SEVERAL DAYS

## 2025-01-08 ASSESSMENT — SOCIAL DETERMINANTS OF HEALTH (SDOH): HOW OFTEN DO YOU GET TOGETHER WITH FRIENDS OR RELATIVES?: TWICE A WEEK

## 2025-01-08 NOTE — PROGRESS NOTES
Preventive Care Visit  [facility]  Ailyn Osullivan MD, Family Medicine  Jan 8, 2025      Assessment & Plan     Routine general medical examination at a health care facility  Low risk not sexually active defers HIV/hep C screening for now but can call if he'd like STI, HIV, hep C screening ordered in the future.     ASHLEY (generalized anxiety disorder)  Obsessive-compulsive disorder, unspecified type  Attention deficit hyperactivity disorder (ADHD), predominantly inattentive type  Mild recurrent major depression  Continue buspirone 10 mg BID. Add bupropion 150 mg daily which has been used in the past and well tolerated. I discussed with the patient the risks, benefits, and alternatives to taking this medication as well as common side effects.   - busPIRone (BUSPAR) 10 MG tablet; Take 1 tablet (10 mg) by mouth 2 times daily.  - buPROPion (WELLBUTRIN XL) 150 MG 24 hr tablet; Take 1 tablet (150 mg) by mouth every morning.    Fear of public speaking  The current medical regimen is effective;  continue present plan and medications.  - propranolol (INDERAL) 10 MG tablet; Take 1 tablet (10 mg) by mouth as needed (patient takes as needed).    Male pattern alopecia  The current medical regimen is effective;  continue present plan and medications. Also uses topical minoxidil.   - finasteride (PROPECIA) 1 MG tablet; Take 1 tablet (1 mg) by mouth daily.      Counseling  Appropriate preventive services were addressed with this patient via screening, questionnaire, or discussion as appropriate for fall prevention, nutrition, physical activity, Tobacco-use cessation, social engagement, weight loss and cognition.  Checklist reviewing preventive services available has been given to the patient.  Reviewed patient's diet, addressing concerns and/or questions.   The patient was instructed to see the dentist every 6 months.   He is at risk for psychosocial distress and has been provided with information to reduce risk.       Return  in about 53 weeks (around 1/14/2026) for Annual Wellness Visit.    Subjective   Miguelito is a 24 year old, presenting for the following:  Physical (Pt wants to discuss medication for seasonal depression)         HPI  Anxiety, OCD, ADHD, seasonal depression. Long-standing diagnoses.   - Medications: Has been treating with buspirone 10 mg twice daily (only uses during the school year). Currently on break so not taking buspirone. No adverse medication effects.   - Goes to the [employer] and studying English and also on pre-med track, but not going to apply to medical school he doesn't think.   - Noticed over the last semester, his sleep has been super disrupted by his upstairs neighbors. Has been doing the bare minimum with his days. Sleeps in as late as he can. It has been better over the last week or so on break.   - Low motivation to look for a job. Hasn't been communicating well. Has been isolating himself.   - Took bupropion in the past for seasonal depression and it worked pretty well. Wondering if he could resume that and hopes that it would also help his ADHD (doesn't like taking stimulants).    - Counseling: Patient is not currently seeing a therapist/counselor.   - Psychiatry: Has been seen by psychiatry for medication management in the past.   - Denies drug or alcohol abuse.     - Past medication trials:   - Bupropion 150 mg, for seasonal depression  - Methylphenidate  - Vyvanse 20 mg  - Adderall IR 5-10 mg in the afternoon   - Gabapentin  - Sertraline Feb 2019, several months, up to 75 mg daily, didn't have effect  - Hydroxyzine PRN  - Fluoxetine 60 mg  - Propranolol 10 mg PRN 3-4x/week    Male pattern hair loss.   - Takes finasteride 1 mg daily and topical minoxidil   - Mild loss of libido with this         1/8/2025   General Health   How would you rate your overall physical health? Excellent   Feel stress (tense, anxious, or unable to sleep) To some extent   (!) STRESS CONCERN      1/8/2025   Nutrition   Three  "or more servings of calcium each day? Yes   Diet: Regular (no restrictions)   How many servings of fruit and vegetables per day? (!) 2-3   How many sweetened beverages each day? 0-1         1/8/2025   Exercise   Days per week of moderate/strenous exercise 5 days   Average minutes spent exercising at this level 40 min         1/8/2025   Social Factors   Frequency of gathering with friends or relatives Twice a week   Worry food won't last until get money to buy more No   Food not last or not have enough money for food? No   Do you have housing? (Housing is defined as stable permanent housing and does not include staying ouside in a car, in a tent, in an abandoned building, in an overnight shelter, or couch-surfing.) No   Are you worried about losing your housing? No   Lack of transportation? No   Unable to get utilities (heat,electricity)? No   Want help with housing or utility concern? No   (!) HOUSING CONCERN PRESENT      1/8/2025   Dental   Dentist two times every year? (!) NO         1/8/2025   TB Screening   Were you born outside of the [place]? No         1/8/2025   Substance Use   Alcohol more than 3/day or more than 7/wk No   Do you use any other substances recreationally? No     Social History     Tobacco Use    Smoking status: Never     Passive exposure: Never    Smokeless tobacco: Never   Vaping Use    Vaping status: Never Used   Substance Use Topics    Alcohol use: Yes     Comment: 2 drinks a week    Drug use: Never         1/8/2025   One time HIV Screening   Previous HIV test? No         1/8/2025   STI Screening   New sexual partner(s) since last STI/HIV test? No         1/8/2025   Contraception/Family Planning   Questions about contraception or family planning No        Reviewed and updated as needed this visit by Provider   Tobacco   Meds  Problems  Med Hx  Surg Hx  Fam Hx  Soc Hx Sexual   Activity             Objective    Exam  /74   Pulse 75   Temp 97.7  F (36.5  C)   Ht 1.768 m (5' 9.61\")  "  Wt 65.8 kg (145 lb)   SpO2 99%   BMI 21.04 kg/m       Physical Exam  GENERAL: alert and no distress  EYES: Eyes grossly normal to inspection, PERRL and conjunctivae and sclerae normal  HENT: ear canals and TM's normal, nose and mouth without ulcers or lesions  NECK: no adenopathy, no asymmetry, masses, or scars  RESP: lungs clear to auscultation - no rales, rhonchi or wheezes  CV: regular rate and rhythm, normal S1 S2, no S3 or S4, no murmur, click or rub, no peripheral edema  ABDOMEN: soft, nontender, without hepatosplenomegaly or masses  MS: no gross musculoskeletal defects noted, no edema  SKIN: no suspicious lesions or rashes  NEURO: Normal strength and tone, mentation intact and speech normal  PSYCH: mentation appears normal, affect normal/bright        1/8/2025     4:01 PM   ASHLEY-7 SCORE   Total Score 2 (minimal anxiety)   Total Score 2        Patient-reported         1/8/2025     4:03 PM 2/5/2024     3:00 PM   PHQ-2 ( 1999 [employer])   Q1: Little interest or pleasure in doing things 1 0   Q2: Feeling down, depressed or hopeless 1 0   PHQ-2 Score 2  0   Q1: Little interest or pleasure in doing things Several days    Q2: Feeling down, depressed or hopeless Several days    PHQ-2 Score 2        Patient-reported       PDMP Review         Value Time User    State PDMP site checked  Yes 1/8/2025  4:06 PM Ailyn Osullivan MD            Signed Electronically by: Ailyn Osullivan MD

## 2025-01-08 NOTE — PATIENT INSTRUCTIONS
Patient Education   Preventive Care Advice   This is general advice given by our system to help you stay healthy. However, your care team may have specific advice just for you. Please talk to your care team about your preventive care needs.  Nutrition  Eat 5 or more servings of fruits and vegetables each day.  Try wheat bread, brown rice and whole grain pasta (instead of white bread, rice, and pasta).  Get enough calcium and vitamin D. Check the label on foods and aim for 100% of the RDA (recommended daily allowance).  Lifestyle  Exercise at least 150 minutes each week  (30 minutes a day, 5 days a week).  Do muscle strengthening activities 2 days a week. These help control your weight and prevent disease.  No smoking.  Wear sunscreen to prevent skin cancer.  Have a dental exam and cleaning every 6 months.  Yearly exams  See your health care team every year to talk about:  Any changes in your health.  Any medicines your care team has prescribed.  Preventive care, family planning, and ways to prevent chronic diseases.  Shots (vaccines)   HPV shots (up to age 26), if you've never had them before.  Hepatitis B shots (up to age 59), if you've never had them before.  COVID-19 shot: Get this shot when it's due.  Flu shot: Get a flu shot every year.  Tetanus shot: Get a tetanus shot every 10 years.  Pneumococcal, hepatitis A, and RSV shots: Ask your care team if you need these based on your risk.  Shingles shot (for age 50 and up)  General health tests  Diabetes screening:  Starting at age 35, Get screened for diabetes at least every 3 years.  If you are younger than age 35, ask your care team if you should be screened for diabetes.  Cholesterol test: At age 39, start having a cholesterol test every 5 years, or more often if advised.  Bone density scan (DEXA): At age 50, ask your care team if you should have this scan for osteoporosis (brittle bones).  Hepatitis C: Get tested at least once in your life.  STIs (sexually  transmitted infections)  Before age 24: Ask your care team if you should be screened for STIs.  After age 24: Get screened for STIs if you're at risk. You are at risk for STIs (including HIV) if:  You are sexually active with more than one person.  You don't use condoms every time.  You or a partner was diagnosed with a sexually transmitted infection.  If you are at risk for HIV, ask about PrEP medicine to prevent HIV.  Get tested for HIV at least once in your life, whether you are at risk for HIV or not.  Cancer screening tests  Cervical cancer screening: If you have a cervix, begin getting regular cervical cancer screening tests starting at age 21.  Breast cancer scan (mammogram): If you've ever had breasts, begin having regular mammograms starting at age 40. This is a scan to check for breast cancer.  Colon cancer screening: It is important to start screening for colon cancer at age 45.  Have a colonoscopy test every 10 years (or more often if you're at risk) Or, ask your provider about stool tests like a FIT test every year or Cologuard test every 3 years.  To learn more about your testing options, visit:   .  For help making a decision, visit:   https://bit.ly/qf26473.  Prostate cancer screening test: If you have a prostate, ask your care team if a prostate cancer screening test (PSA) at age 55 is right for you.  Lung cancer screening: If you are a current or former smoker ages 50 to 80, ask your care team if ongoing lung cancer screenings are right for you.  For informational purposes only. Not to replace the advice of your health care provider. Copyright   2023 [employer]. All rights reserved. Clinically reviewed by the [facility] Transitions Program. [employer] 759813 - REV 01/24.

## 2025-03-30 ENCOUNTER — MYC MEDICAL ADVICE (OUTPATIENT)
Dept: FAMILY MEDICINE | Facility: CLINIC | Age: 25
End: 2025-03-30

## 2025-03-30 DIAGNOSIS — L70.0 ACNE VULGARIS: ICD-10-CM

## 2025-04-01 RX ORDER — TRETINOIN 0.5 MG/G
CREAM TOPICAL AT BEDTIME
Qty: 45 G | Refills: 3 | Status: SHIPPED | OUTPATIENT
Start: 2025-04-01

## 2025-04-01 NOTE — TELEPHONE ENCOUNTER
Medication requested: tretinoin (RETIN-A) 0.05 % external cream   Last office visit: 1/8/25  [facility] appointments: none  Medication last refilled: 9/29/23; 45 g + 3 refills  Last qualifying labs: N/A    Prescription approved per [facility] Refill Protocol.    Dionte ALLEN, RN  04/01/25 3:00 PM

## 2025-04-22 ENCOUNTER — MYC REFILL (OUTPATIENT)
Dept: FAMILY MEDICINE | Facility: CLINIC | Age: 25
End: 2025-04-22

## 2025-04-22 DIAGNOSIS — F40.248 FEAR OF PUBLIC SPEAKING: ICD-10-CM

## 2025-04-24 RX ORDER — PROPRANOLOL HYDROCHLORIDE 10 MG/1
10 TABLET ORAL PRN
Qty: 90 TABLET | Refills: 0 | Status: SHIPPED | OUTPATIENT
Start: 2025-04-24

## 2025-04-24 NOTE — TELEPHONE ENCOUNTER
Medication requested: propranolol (INDERAL) 10 MG tablet   Last office visit: 1/8/2025  [facility] appointments: none  Medication last refilled: 1/8/2025; #90 + 0 refills  Last qualifying labs:     BP Readings from Last 3 Encounters:   01/08/25 127/74   10/30/24 125/75   07/01/24 112/74     Prescription approved per [facility] Refill Protocol.    ALEJANDRO Albarado, RN  04/24/25, 11:40 AM